# Patient Record
(demographics unavailable — no encounter records)

---

## 2025-02-13 NOTE — HISTORY OF PRESENT ILLNESS
[FreeTextEntry1] : 20 years old female patient who been complaining of chest pain since last 2 months intermittently, no needs exertion, lasting about 15 to 20 minutes, no radiation,, no shortness of breath the same time referred for evaluation.  She denies any PND, orthopnea, diaphoresis, dizziness, palpitation palpated by  No prior history of heart murmur, intermittent fever.  She does not take any medications.

## 2025-02-13 NOTE — ADDENDUM
[FreeTextEntry1] : February 13, 2025   echocardiogram showed normal LV size, LV thickness, and wall motion, LV 60% without signal valvular abnormality and normal pulmonary pressure.  No pericardial effusion. February 13, 2025    stress test was done using Micky protocol; she exercised for 9 minutes with peak heart rate 172 bpm, peak blood pressure 132/58 mmHg, no symptoms, no EKG changes, exercise Duke treadmill score 9 which is low risk; nonischemic EKG.   Overall patient was evaluated for chest pain; most likely noncardiac etiology Cardiac wise patient stable to work without any limitation.  Please call me if I can assist you further.

## 2025-02-13 NOTE — ASSESSMENT
[FreeTextEntry1] : Chest pain -I requested echocardiography for LV size, LV thickness, and wall motion, LVEF and valvular morphology.  I also recommend ETT to see inducible symptoms or ischemia.  Risk factor modification has been discussed in great length.  She will be eval by me after cardiac testing.